# Patient Record
Sex: FEMALE | Race: WHITE
[De-identification: names, ages, dates, MRNs, and addresses within clinical notes are randomized per-mention and may not be internally consistent; named-entity substitution may affect disease eponyms.]

---

## 2018-04-06 ENCOUNTER — HOSPITAL ENCOUNTER (OUTPATIENT)
Dept: HOSPITAL 88 - CT | Age: 53
End: 2018-04-06
Attending: SPECIALIST
Payer: COMMERCIAL

## 2018-04-06 DIAGNOSIS — R19.00: Primary | ICD-10-CM

## 2018-04-06 PROCEDURE — 72192 CT PELVIS W/O DYE: CPT

## 2018-04-06 NOTE — DIAGNOSTIC IMAGING REPORT
PROCEDURE:CT PELVIS WITHOUT CONTRAST

 

COMPARISON:None.

 

INDICATIONS:PELVIC MASS

 

TECHNIQUE:Spiral CT images of the pelvis were performed from the iliac 

crest to the lesser trochanters. No intravenous contrast was 

administered. Redicat was given as oral contrast. Coronal and sagittal 

reformatted images were obtained.

 

FINDINGS:

PELVIC ORGANS:Bladder is unremarkable. Uterus is grossly unremarkable. 

No adnexal masses.

PELVIC NODES:No distal retroperitoneal, pelvic, or inguinal 

adenopathy.

GI TRACT:No bowel dilation or evidence of obstruction. No pericolonic 

inflammatory changes. Moderate retained stool in the colon.

VESSELS:Unremarkable for noncontrast exam.

BONES:No aggressive lytic lesions..

OTHER:Soft tissues are grossly unremarkable..

 

CONCLUSION:

1. Essentially unremarkable exam. No pelvic mass is identified in this 

nonenhanced exam.

 

 

Placido Alex M.D.  

Dictated by:  Placido Alex M.D. on 4/06/2018 at 18:38     

Electronically approved by:  Placido Alex M.D. on 4/06/2018 at 

18:38

## 2018-04-13 ENCOUNTER — HOSPITAL ENCOUNTER (EMERGENCY)
Dept: HOSPITAL 88 - ER | Age: 53
Discharge: HOME | End: 2018-04-13
Payer: COMMERCIAL

## 2018-04-13 VITALS — WEIGHT: 180 LBS | BODY MASS INDEX: 28.93 KG/M2 | HEIGHT: 66 IN

## 2018-04-13 VITALS — SYSTOLIC BLOOD PRESSURE: 106 MMHG | DIASTOLIC BLOOD PRESSURE: 78 MMHG

## 2018-04-13 DIAGNOSIS — K59.00: ICD-10-CM

## 2018-04-13 DIAGNOSIS — K40.91: Primary | ICD-10-CM

## 2018-04-13 PROCEDURE — 74018 RADEX ABDOMEN 1 VIEW: CPT

## 2018-04-13 PROCEDURE — 99283 EMERGENCY DEPT VISIT LOW MDM: CPT

## 2018-04-13 NOTE — XMS REPORT
Patient Summary Document

 Created on: 2018



SIDDHARTH SOLO

External Reference #: [id number]

: 1965

Sex: Female



Demographics







 Address  [address] [place] [address]

 

 Home Phone  [phone]

 

 Preferred Language  Unknown

 

 Marital Status  Unknown

 

 Buddhism Affiliation  Unknown

 

 Race  Unknown

 

 Additional Race(s)  

 

 Ethnic Group  Unknown





Author







 Author  [facility]

 

 Address  Unknown

 

 Phone  Unavailable







Care Team Providers







 Care Team Member Name  Role  Phone

 

 SUSAN RODRÍGUEZ  Unavailable  Unavailable







Problems

This patient has no known problems.



Allergies, Adverse Reactions, Alerts

This patient has no known allergies or adverse reactions.



Medications

This patient has no known medications.



Results







 Test Description  Test Time  Test Comments  Text Results  Atomic Results  
Result Comments









 CT PELVIS WO            [id number] [place] [address]      Patient Name: SIDDHARTH SOLO   MR 
#: [id number]    : 1965 Age/Sex: 52/F  Acct #: [id number] Req #: [id number]
[id number]  Adm Physician:     Ordered by: SUSAN RODRÍGUEZ MD  Report #: [id number]   
Location: CT  Room/Bed:     ____________________________________________________
_______________________________________________    Procedure: [id number] CT/CT 
PELVIS WO  Exam Date: 18                            Exam Time: 1805       
REPORT STATUS: Signed    PROCEDURE:   CT PELVIS WITHOUT CONTRAST       
COMPARISON:   None.       INDICATIONS:   PELVIC MASS       TECHNIQUE:   Spiral 
CT images of the pelvis were performed from the iliac    crest to the lesser 
trochanters. No intravenous contrast was    administered. Redicat was given as 
oral contrast. Coronal and sagittal    reformatted images were obtained.       
FINDINGS:      PELVIC ORGANS:   Bladder is unremarkable. Uterus is grossly 
unremarkable.    No adnexal masses.   PELVIC NODES:   No distal retroperitoneal
, pelvic, or inguinal    adenopathy.   GI TRACT:   No bowel dilation or 
evidence of obstruction. No pericolonic    inflammatory changes. Moderate 
retained stool in the colon.   VESSELS:   Unremarkable for noncontrast exam.   
BONES:   No aggressive lytic lesions..   OTHER:   Soft tissues are grossly 
unremarkable..       CONCLUSION:   1. Essentially unremarkable exam. No pelvic 
mass is identified in this    nonenhanced exam.              Marci Alex M.D.     Dictated by:  Marci Alex M.D. on 2018 at 18:38        
Electronically approved by:  Marci Alex M.D. on 2018 at    18:38  
              Dictated By: MARCI ALEX MD  Electronically Signed By: MARCI ALEX MD on 18 183  Transcribed By: TRACY on 18       COPY 
TO:   SUSAN RODRÍGUEZ MD

## 2018-04-13 NOTE — DIAGNOSTIC IMAGING REPORT
PROCEDURE:X-RAY ABDOMEN - KUB

 

COMPARISON:None.

 

INDICATIONS:LEFT LOWER PELVIC PAIN

 

FINDINGS:

 

There are no dilated loops of bowel to suggest obstruction.  There are 

no masses or abnormal calcifications.   There is no evidence of free 

air. No acute osseous abnormalities are present.

 

 

CONCLUSION:

No acute abdominal abnormality. 

 

 

 

 

Nestor Kwok D.O.  

Dictated by:  Nestor Kwok D.O. on 4/13/2018 at 8:40     

Electronically approved by:  Nestor Kwok D.O. on 4/13/2018 at 8:40

## 2018-06-08 LAB
ANION GAP SERPL CALC-SCNC: 8.9 MMOL/L (ref 8–16)
BASOPHILS # BLD AUTO: 0.1 10*3/UL (ref 0–0.1)
BASOPHILS NFR BLD AUTO: 0.6 % (ref 0–1)
BUN SERPL-MCNC: 13 MG/DL (ref 7–26)
BUN/CREAT SERPL: 17 (ref 6–25)
CALCIUM SERPL-MCNC: 10.2 MG/DL (ref 8.4–10.2)
CHLORIDE SERPL-SCNC: 105 MMOL/L (ref 98–107)
CO2 SERPL-SCNC: 25 MMOL/L (ref 22–29)
DEPRECATED NEUTROPHILS # BLD AUTO: 5.5 10*3/UL (ref 2.1–6.9)
EGFRCR SERPLBLD CKD-EPI 2021: > 60 ML/MIN (ref 60–?)
EOSINOPHIL # BLD AUTO: 0.1 10*3/UL (ref 0–0.4)
EOSINOPHIL NFR BLD AUTO: 1.3 % (ref 0–6)
ERYTHROCYTE [DISTWIDTH] IN CORD BLOOD: 12.5 % (ref 11.7–14.4)
GLUCOSE SERPLBLD-MCNC: 87 MG/DL (ref 74–118)
HCT VFR BLD AUTO: 38.5 % (ref 34.2–44.1)
HGB BLD-MCNC: 13.2 G/DL (ref 12–16)
LYMPHOCYTES # BLD: 1.9 10*3/UL (ref 1–3.2)
LYMPHOCYTES NFR BLD AUTO: 23.3 % (ref 18–39.1)
MCH RBC QN AUTO: 31.5 PG (ref 28–32)
MCHC RBC AUTO-ENTMCNC: 34.3 G/DL (ref 31–35)
MCV RBC AUTO: 91.9 FL (ref 81–99)
MONOCYTES # BLD AUTO: 0.5 10*3/UL (ref 0.2–0.8)
MONOCYTES NFR BLD AUTO: 6.6 % (ref 4.4–11.3)
NEUTS SEG NFR BLD AUTO: 67.8 % (ref 38.7–80)
PLATELET # BLD AUTO: 304 X10E3/UL (ref 140–360)
POTASSIUM SERPL-SCNC: 3.9 MMOL/L (ref 3.5–5.1)
RBC # BLD AUTO: 4.19 X10E6/UL (ref 3.6–5.1)
SODIUM SERPL-SCNC: 135 MMOL/L (ref 136–145)

## 2018-06-11 ENCOUNTER — HOSPITAL ENCOUNTER (OUTPATIENT)
Dept: HOSPITAL 88 - OR | Age: 53
Discharge: HOME | End: 2018-06-11
Attending: SURGERY
Payer: COMMERCIAL

## 2018-06-11 DIAGNOSIS — Z88.0: ICD-10-CM

## 2018-06-11 DIAGNOSIS — Z88.8: ICD-10-CM

## 2018-06-11 DIAGNOSIS — Z01.810: ICD-10-CM

## 2018-06-11 DIAGNOSIS — K40.90: Primary | ICD-10-CM

## 2018-06-11 DIAGNOSIS — Z01.812: ICD-10-CM

## 2018-06-11 DIAGNOSIS — Z91.041: ICD-10-CM

## 2018-06-11 DIAGNOSIS — K44.9: ICD-10-CM

## 2018-06-11 PROCEDURE — 81025 URINE PREGNANCY TEST: CPT

## 2018-06-11 PROCEDURE — 85025 COMPLETE CBC W/AUTO DIFF WBC: CPT

## 2018-06-11 PROCEDURE — 93005 ELECTROCARDIOGRAM TRACING: CPT

## 2018-06-11 PROCEDURE — 80048 BASIC METABOLIC PNL TOTAL CA: CPT

## 2018-06-11 PROCEDURE — 49505 PRP I/HERN INIT REDUC >5 YR: CPT

## 2018-06-11 PROCEDURE — 36415 COLL VENOUS BLD VENIPUNCTURE: CPT

## 2018-06-11 NOTE — XMS REPORT
Continuity of Care Document

 Created on: 2018



ANNIKA SOLO

External Reference #: [id number]

: 1965

Sex: Female



Demographics







 Address  [address] [place]  [address]

 

 Home Phone  [phone]

 

 Preferred Language  Unknown

 

 Marital Status  Unknown

 

 Presybeterian Affiliation  Unknown

 

 Race  White

 

 Ethnic Group  Unknown





Author







 Author  [facility]

 

 Organization  [facility]

 

 Address  [address]

[place]  [address]



 

 Phone  Unavailable







Support







 Name  Relationship  Address  Phone

 

 OSBALDO DIGGS   Caregiver  [address]

[place]  [address]  Unavailable

 

 SUSAN RODRÍGUEZ MD  Caregiver  [address]  [phone]

 

 SUSAN RODRÍGUEZ MD  Caregiver  [address]  [phone]

 

 STACIA SOLO  Next Of Kin  [address] [place], [place]  [phone]







Care Team Providers







 Care Team Member Name  Role  Phone

 

 SUSAN RODRÍGUEZ MD  PCP  [phone]







Insurance Providers







 Guarantor  Annika Solo

 

 Address  [address] [place] [address]

 

 Phone  [phone]

 

 Email  [email or web address]











 Payer  [facility]

 

 Policy Number  [id number]

 

 Subscriber's Name  DenisAnnika

 

 Relationship  18 Self / Same As Patient

 

 Group Number  [id number]

 

 Group Name  [email or web address]

 

 Effective Date  18







Advance Directives







 Directive  Response  Recorded Date/Time

 

 Does the patient have an advance directive?  No  18 4:40pm

 

 If yes, is advance directive on file with [facility]?  No  18 4:40pm

 

 If not on file with [facility] will patient provide a copy?  No  18 4:40pm

 

 Do you have a Directive to Physician?  No  18 8:24am

 

 Do you have a Medical Power of ?  No  18 8:24am

 

 Do you have an out of hospital Do Not Resuscitate Order?  No  18 8:24am

 

 Do you have any special needs we should be aware of?  No  18 8:24am

 

 Do you have a support person here with you today?  Yes  18 8:24am

 

 Did patient receive Notice of Privacy Practices?  Yes  18 8:24am

 

 Did patient receive patient rights and responsibilities?  Yes  18 8:24am







Problems

No problem information available.



Medications

No medication information available.



Social History







 Smoking Status  Start Date  Stop Date

 

 Never Smoker      







Hospital Discharge Instructions

No hospital discharge instruction information available.



Plan of Care







 Discharge Date  18 9:30am

 

 Disposition  HOME, SELF-CARE

 

 Condition at Discharge  Stable

 

 Instructions/Education Provided  Constipation - Adult

 

 Forms Provided  Work/School Excuse

 

 Prescriptions  See Medication Section

 

 Additional Instructions/Education  FOLLOW UP WITH YOUR DOCTOR IN 2 DAYS



DRINK PLEANTY OF WATER



TAKE ALL MEDICATIONS AS PRESCRIBED  







Functional Status

No functional status information available.



Allergies, Adverse Reactions, Alerts







 Allergen  Type  Severity  Reaction  Status  Last Updated

 

 Iodine and Iodide Containing Produc  Allergy  Mild  BURNING  Active  18

 

 Penicillin  Allergy  Mild  "PASSING OUT"  Active  18

 

 Diphenhydramine  Adverse Reaction  Mild  "FEELS HEART RACING"  Active  18







Immunizations

No immunization information available.



Vital Signs





Acute Vital Signs





 Vital  Response  Date/Time

 

 Pulse      

 

    Pulse Rate (adult)  78 bpm (60 - 90)  2018 9:07am

 

 Respiratory Rate  20 bpm (12 - 24)  2018 9:07am

 

 Blood Pressure  106/78 mm Hg  2018 9:07am

 

 Height  5 ft 6 in  2018 7:23am

 

 Weight  180 lb  2018 7:23am

 

 Body Mass Index  29.1 kg/m^2  2018 7:23am







Results

No relevant diagnostic test, laboratory data and/or discharge summary 
information available.



Procedures







 Procedure  Status  Date  Provider(s)

 

 Computed tomography of pelvis without contrast  Active  18  SUSAN RODRÍGUEZ MD







Encounters







 Encounter  Location  Arrival/Admit Date  Discharge/Depart Date  Attending 
Provider

 

 Departed Emergency Room  [facility]  18 7:08am   9:30am  OSBALDO DIGGS DO

 

 Registered Clinic  [facility]  18 4:41pm     SUSAN RODRÍGUEZ MD

## 2018-06-11 NOTE — OPERATIVE REPORT
DATE OF PROCEDURE:  June 11, 2018 



PREOPERATIVE DIAGNOSIS:  Left inguinal hernia.



POSTOPERATIVE DIAGNOSIS:  Left inguinal hernia.  



OPERATION PERFORMED:  Repair of left inguinal hernia with large Prolene 

hernia system.



ASSISTANT:  JAZ Rodriguez.



ANESTHESIA:  General.  



COMPLICATIONS:  None.



ESTIMATED BLOOD LOSS:  Minimal.



DESCRIPTION OF PROCEDURE:  With the patient lying in bed in the supine 

position under good general anesthesia, the abdomen was prepped with 

Betadine solution and draped in the usual manner.  A left inguinal incision 

was made.  It was carried down through the subcutaneous tissue down to the 

external oblique aponeurosis.  The external oblique was opened along the 

length of its fibers and the external inguinal ring was opened.  The cord 

was then mobilized off of the pubic tubercle and ligated with 2-0 Vicryl 

and divided.  The round ligament and the hernia sac were then  and 

individually suture ligated with 2-0 silk and resected.  The hernia sac was 

then reduced back to the intra-abdominal cavity, and the preperitoneal 

space was entered right through the internal ring without any difficulty 

and a pocket was created.  A large Prolene hernia system was then placed in 

the preperitoneal space and the underlay patch was deployed without any 

problems.  The overlay patch was then placed over the floor and sutured to 

the conjoined tendon and inguinal ligament using interrupted sutures of 2-0 

Vicryl.  The whole area was thoroughly irrigated.  Perfect hemostasis was 

ascertained.  All layers were infiltrated on the way out with a solution of 

0.25% Marcaine and 1% lidocaine mixed in equal parts.  The external oblique 

aponeurosis was closed with a running suture of 2-0 Vicryl.  The 

subcutaneous tissue was approximated with 3-0 plain and the skin was closed 

with clips.  A dressing was applied.  The sponge, lap and needle count was 

correct.  The patient tolerated the procedure well and returned to the 

recovery room in stable condition.  

 









DD:  06/11/2018 09:45

DT:  06/11/2018 13:33

Job#:  [id number]

## 2019-08-19 ENCOUNTER — HOSPITAL ENCOUNTER (EMERGENCY)
Dept: HOSPITAL 88 - ER | Age: 54
LOS: 1 days | Discharge: HOME | End: 2019-08-20
Payer: COMMERCIAL

## 2019-08-19 VITALS — BODY MASS INDEX: 28.93 KG/M2 | HEIGHT: 66 IN | WEIGHT: 180 LBS

## 2019-08-19 DIAGNOSIS — K52.1: ICD-10-CM

## 2019-08-19 DIAGNOSIS — R11.2: ICD-10-CM

## 2019-08-19 DIAGNOSIS — R19.7: Primary | ICD-10-CM

## 2019-08-19 PROCEDURE — 74177 CT ABD & PELVIS W/CONTRAST: CPT

## 2019-08-19 PROCEDURE — 83690 ASSAY OF LIPASE: CPT

## 2019-08-19 PROCEDURE — 82550 ASSAY OF CK (CPK): CPT

## 2019-08-19 PROCEDURE — 99284 EMERGENCY DEPT VISIT MOD MDM: CPT

## 2019-08-19 PROCEDURE — 85025 COMPLETE CBC W/AUTO DIFF WBC: CPT

## 2019-08-19 PROCEDURE — 82553 CREATINE MB FRACTION: CPT

## 2019-08-19 PROCEDURE — 83735 ASSAY OF MAGNESIUM: CPT

## 2019-08-19 PROCEDURE — 36415 COLL VENOUS BLD VENIPUNCTURE: CPT

## 2019-08-19 PROCEDURE — 81001 URINALYSIS AUTO W/SCOPE: CPT

## 2019-08-19 PROCEDURE — 80053 COMPREHEN METABOLIC PANEL: CPT

## 2019-08-19 PROCEDURE — 84484 ASSAY OF TROPONIN QUANT: CPT

## 2019-08-19 NOTE — XMS REPORT
Encounter Summary

                             Created on: 2019



TucsonNubia melissan HEENA

External Reference #: [id number]

: 1965

Sex: Female



Demographics







                          Address                   [address] [place]  [address]

 

                          Home Phone                [phone]

 

                          Preferred Language        English

 

                          Marital Status            

 

                          Spiritism Affiliation     Unknown

 

                          Race                      White

 

                          Ethnic Group              Non-





Author







                          Organization              Unknown

 

                          Address                   [address] [place]  [address]



 

                          Phone                     [phone]







Care Team Providers







                    Care Team Member Name    Role                Phone

 

                    Dr. Soila Kevin    3                   [phone]

 

                    Soila Kevin MD    3                   [phone]

 

                    Eric Pierre MD    2                   [phone]

 

                    [facility]    2                   [phone]



                                                      



Reason for Visit

                      





                                        Left ear pain/problem; cough                



                                                                              



Instructions

          





                                        1. Upper respiratory infection

 

                                         Zithromax Z-Justin 250 mg tablet

 

                                         triamcinolone acetonide 40 mg/mL suspension for injection

 

                                        2. Otalgia

 

                                         neomycin-polymyxin-hydrocort 3.5 mg-10,000 unit/mL-1 % ear drops,susp

 

                                        3. Immunization refused

 

                                        4. Body mass index 25-29 - overweight

 

                                         learning about healthy weight







Discussion Note: None recorded.                                                 
                                      



Plan of Care

                      





                Reminders                                                                    Provider 

               

 

                Appointments    None recorded.                   

 

                Lab             None recorded.                   

 

                Referral        None recorded.                   

 

                Procedures      None recorded.                   

 

                Surgeries       None recorded.                   

 

                Imaging         None recorded.                   



                                                                              



Medications

                      





                    Name                      Start Date                                      

 

                                        ipratropium bromide 0.03 % nasal spray

 Spray 2 sprays twice a day by intranasal route.    

 

                                        montelukast 10 mg tablet

 Take 1 tablet every day by oral route.    

 

                                        neomycin-polymyxin-hydrocort 3.5 mg-10,000 unit/mL-1 % ear drops,susp

 INSTILL 4 DROPS INTO AFFECTED EAR(S) BY OTIC ROUTE 3 TIMES PER DAY FOR 5 DAYS    



 

                                        Tessalon Perles 100 mg capsule

 Take 1 capsule 3 times a day by oral route.    

 

                                        triamcinolone acetonide 0.1 % topical cream

 APPLY A THIN LAYER TO THE AFFECTED AREA(S) BY TOPICAL ROUTE 2 TIMES PER DAY    

 

                                        triamcinolone acetonide 40 mg/mL suspension for injection

 1 ml                                   

 

                                        Zithromax Z-Justin 250 mg tablet

 TAKE 2 TABLETS (500 MG) BY ORAL ROUTE ONCE DAILY FOR 1 DAY THEN 1 TABLET (250 
MG) BY ORAL ROUTE ONCE DAILY FOR 4 DAYS    



                                                                                
                                                                   



Medications Administered

                      





                    Name                      Date                                      

 

                                        triamcinolone acetonide 40 mg/mL suspension for injection

                          1 ml                      [id number]:14:13



                                                                                
       



Vitals

          





                Height          Weight          BMI             Blood Pressure 

 

                 5 ft 6 in        185.4 lbs        29.9 kg/m2        120/68 mm[Hg]  



                                                                              



Lab Results

                      



              None recorded.                                                    
                                                



Allergies

          





          Code      Code System    Name      Reaction    Severity    Status    Onset

 

          2034    RxNorm    Benadryl    Irregular Heart Rate    Mild to Moderate    Active    

 

          040028    RxNorm    Carafate    Vomiting             Active    

 

          5493      RxNorm    Hydrocodone    Dizziness    Severe    Active    

 

          5933      RxNorm    Iodine                      Active    

 

                              Penicillins    Dizziness    Moderate to Severe    Active    



                                                                                
                                      



Problems

                      





                Name                      Status                      Onset Date                      

Source                                                  

 

                Left Inguinal Hernia    Active          2018      

 

                Polyp of Colon    Active          02/10/2019      



                                                                                
                  



Procedures

                      





                Date                      Name                      Performed by                      

                

 

                    2018          Hernia Repair       Information not available

 

                    1994          Tubal Ligation      Information not available



                                                                                
                  



Vaccine List

          





                                        Vaccine Type

 

                                        influenza, unspecified formulation

 

                                        2018



                                                                              



Social History

          





                    Smoking Status      Never Smoker         



                                                                              



Past Encounters

                      





                                        2019

Upper Respiratory Infection; Otalgia; Immunization Refused; Body Mass Index 25-
29 - Overweight

Palomo Carroll MD: [address] [place] [address], Ph. 
[phone]

 

                                        2019

Pruritic Rash; Immunization; Body Mass Index 25-29 - Overweight

Soila Kevin MD: [address] [place] [address], Ph. [phone]



                                                                                
                  



History of Present Illness

          



Note:Left ear pain since yesterday. Nasal congestion, sinus pressure and dry 
cough since 4 days ago. Pt was started on montelukast, tessalon capsules and 
ipratropium NS 3 days ago, but she is feeling worse. Denies fever, sob, wheezing
 or chest pain.



Review of Systems:ROS as noted in the HPI                                       
                             



Review of Systems

          None recorded.                                                        
            



Physical Exam

                      





                                                   General Adult Exam (male)

 

                          Reported By:              Patient

 

                          Constitutional:           General Appearance: healthy-appearing, overweight. Level of Distress:

 NAD. Ambulation: ambulating normally

 

                          Psychiatric:              Insight: good judgement. Mental Status: active and alert, normal mood,

 normal affect. Orientation: to time, to place, to person. Memory: recent memory
 normal, remote memory normal

 

                          Eyes:                     Lids and Conjunctivae: non-injected, no discharge. EOM: EOMI

 

                          ENMT:                     Ears: EACs clear, TMs clear. Nose: nares non-patent, sinus tenderness, nasal

 discharge. Lips, Teeth, and Gums: no mouth or lip ulcers. Oropharynx: moist 
mucous membranes, no exudates, tonsils not enlarged, erythema

 

                          Neck:                     Neck: supple, trachea midline. Lymph Nodes: cervical LAD. Thyroid: no enlargement,

 non-tender

 

                          Lungs:                    Auscultation: breath sounds normal

 

                          Cardiovascular:           Heart Auscultation: RRR, normal S1, normal S2, no murmurs

 

                          Neurologic:               Gait and Station: normal gait. Cranial Nerves: grossly intact

## 2019-08-19 NOTE — XMS REPORT
Encounter Summary

                             Created on: 2019



Annika Barrios

External Reference #: [id number]

: 1965

Sex: Female



Demographics







                          Address                   [address] [place]  [address]

 

                          Home Phone                [phone]

 

                          Preferred Language        English

 

                          Marital Status            

 

                          Synagogue Affiliation     Unknown

 

                          Race                      White

 

                          Ethnic Group              Non-





Author







                          Organization              Unknown

 

                          Address                   [address] [place] [address]  [address]



 

                          Phone                     [phone]







Care Team Providers







                    Care Team Member Name    Role                Phone

 

                    Dr. Soila Kevin    3                   [phone]

 

                    Soila Kevin MD    3                   [phone]

 

                    Eric Pierre MD    2                   [phone]

 

                    [facility]    2                   [phone]



                                                      



Reason for Visit

          





                                        skin problem/rash



                                                                    



Instructions

          





                                        1. Pruritic rash

 

                                         dermatology referral - **PLEASE FAX NOTES [phone].**

 

                                         Medrol (Justin) 4 mg tablets in a dose pack

 

                                         triamcinolone acetonide 0.1 % topical cream

 

                                        2. Immunization

 

                                        3. Body mass index 25-29 - overweight

 

                                         learning about healthy weight







Discussion Note: None recorded.                                                 
                                      



Plan of Care

                      





                Reminders                                                                    Provider 

               

 

                Appointments    None recorded.                   

 

                Lab             None recorded.                   

 

                Referral        Dermatology Referral    2019      [facility]

 

                Procedures      None recorded.                   

 

                Surgeries       None recorded.                   

 

                Imaging         None recorded.                   



                                                                                
       



Medications

                      





                    Name                      Start Date                                      

 

                                        Medrol (Justin) 4 mg tablets in a dose pack

 Take 1 dose pk by oral route.          

 

                                        triamcinolone acetonide 0.1 % topical cream

 APPLY A THIN LAYER TO THE AFFECTED AREA(S) BY TOPICAL ROUTE 2 TIMES PER DAY    



                                                                                
                 



Medications Administered

          



  None recorded.                                                                
               



Vitals

          





                Height          Weight          BMI             Blood Pressure 

 

                 5 ft 6 in        185 lbs         29.9 kg/m2        133/60 mm[Hg]  



                                                                              



Lab Results

                      



              None recorded.                                                    
                                                



Allergies

          





          Code      Code System    Name      Reaction    Severity    Status    Onset

 

          2034    RxNorm    Benadryl    Irregular Heart Rate    Mild to Moderate    Active    

 

          1306      RxNorm    Hydrocodone    Dizziness    Severe    Active    

 

          0933      RxNorm    Iodine                      Active    

 

                              Penicillins    Dizziness    Moderate to Severe    Active    



                                                                                
                            



Problems

                      





                Name                      Status                      Onset Date                      

Source                                                  

 

                Left Inguinal Hernia    Active          2018      

 

                Polyp of Colon    Active          02/10/2019      



                                                                                
                  



Procedures

                      





                Date                      Name                      Performed by                      

                

 

                    2018          Hernia Repair       Information not available

 

                    1994          Tubal Ligation      Information not available



                                                                                
                  



Vaccine List

          



None recorded.                                                                  
            



Social History

          





                    Smoking Status      Never Smoker         



                                                                              



Past Encounters

                      





                                        2019

Pruritic Rash; Immunization; Body Mass Index 25-29 - Overweight

Soila Kevin MD: [address] [place] [address], Ph. [phone]



                                                                                
        



History of Present Illness

          



Note:54yo female presents for evaluation of skin rash. Switched to new version 
of Caress body wash in 2018 after working in yard to remove oleander bush 
and developed itchy rash on right arm. Used hydrocortisone cream &amp; rash 
became less red &amp; irritated. Intermittently reoccurred on right arm since 
Nov, despite not using body wash at all. About three weeks ago, developed itchy 
rash on left shoulder. No known exposure. Then developed itchy rash on anterior 
chest.<div>Now has rash on right arm, left shoulder and left anterior chest. No 
blisters, no pain, no drainage. Very itchy despite using OTC 1% hydrocortisone 
cream.</div><div>No new foods. No new soaps or detergents. No new medications - 
only taking stool softners after left inguinal hernia repair on 18 by Dr. Shmuel Pierre.</div><div>Cannot take benadryl (palpitations). Can take 
Claritin.</div>



Review of Systems:ROS as noted in the HPI                                       
                             



Review of Systems

                      





                                                   Comprehensive General Adult ROS

 

                          Reported By:              Patient

 

                          Constitutional:           Constitutional: no fever

 

                          Eyes:                     Eyes: no vision change

 

                          Cardiovascular:           Cardiovascular: no chest pain

 

                          Respiratory:              Respiratory: no cough, no wheezing, no shortness of breath

 

                          Gastrointestinal:         Gastrointestinal: no abdominal pain

 

                          Integumentary:            Skin: rash, itching, dry skin

 

                          Neurologic:               Neurologic: no loss of consciousness, no headaches

 

                          Psychiatric:              Psych: no depression, no alcohol abuse, no anxiety, no suicidal thoughts





                                                                              



Physical Exam

                      





                                                   General Adult Exam (Female)

 

                          Reported By:              Patient

 

                          Constitutional:           General Appearance: healthy-appearing, well-nourished, well-developed.

 Level of Distress: NAD. Ambulation: ambulating normally

 

                          Psychiatric:              Mental Status: active and alert, normal mood, normal affect

 

                          Eyes:                     Lids and Conjunctivae: non-injected. Pupils: PERRLA. EOM: EOMI. Sclerae: 

non-icteric

 

                          ENMT:                     Hearing: no hearing loss. Oropharynx: moist mucous membranes

 

                          Neck:                     Thyroid: non-tender, no nodules

 

                          Lungs:                    Respiratory effort: no dyspnea. Auscultation: breath sounds normal, good

 air movement, no wheezing, no rales/crackles

 

                          Cardiovascular:           Heart Auscultation: RRR, normal S1, normal S2, no murmurs. Neck

 vessels: no carotid bruits

 

                          Abdomen:                  Bowel Sounds: normal. Inspection and Palpation: soft

 

                          Musculoskeletal::         Joints, Bones, and Muscles: normal movement of all extremities.

 Extremities: no edema

 

                          Neurologic:               Gait and Station: normal gait

 

                          Skin:                     Inspection and palpation: rash, lesion; see photos below

## 2019-08-20 LAB
ALBUMIN SERPL-MCNC: 4.1 G/DL (ref 3.5–5)
ALBUMIN/GLOB SERPL: 1.6 {RATIO} (ref 0.8–2)
ALP SERPL-CCNC: 49 IU/L (ref 40–150)
ALT SERPL-CCNC: 20 IU/L (ref 0–55)
ANION GAP SERPL CALC-SCNC: 15.1 MMOL/L (ref 8–16)
BACTERIA URNS QL MICRO: (no result) /HPF
BASOPHILS # BLD AUTO: 0 10*3/UL (ref 0–0.1)
BASOPHILS NFR BLD AUTO: 0.1 % (ref 0–1)
BILIRUB UR QL: NEGATIVE
BUN SERPL-MCNC: 14 MG/DL (ref 7–26)
BUN/CREAT SERPL: 18 (ref 6–25)
CALCIUM SERPL-MCNC: 9.2 MG/DL (ref 8.4–10.2)
CHLORIDE SERPL-SCNC: 106 MMOL/L (ref 98–107)
CK MB SERPL-MCNC: 0.9 NG/ML (ref 0–5)
CK SERPL-CCNC: 62 IU/L (ref 29–168)
CLARITY UR: CLEAR
CO2 SERPL-SCNC: 21 MMOL/L (ref 22–29)
COLOR UR: YELLOW
DEPRECATED NEUTROPHILS # BLD AUTO: 15 10*3/UL (ref 2.1–6.9)
DEPRECATED RBC URNS MANUAL-ACNC: (no result) /HPF (ref 0–5)
EGFRCR SERPLBLD CKD-EPI 2021: > 60 ML/MIN (ref 60–?)
EOSINOPHIL # BLD AUTO: 0 10*3/UL (ref 0–0.4)
EOSINOPHIL NFR BLD AUTO: 0.1 % (ref 0–6)
EPI CELLS URNS QL MICRO: (no result) /LPF
ERYTHROCYTE [DISTWIDTH] IN CORD BLOOD: 12.8 % (ref 11.7–14.4)
GLOBULIN PLAS-MCNC: 2.6 G/DL (ref 2.3–3.5)
GLUCOSE SERPLBLD-MCNC: 123 MG/DL (ref 74–118)
HCT VFR BLD AUTO: 38.3 % (ref 34.2–44.1)
HGB BLD-MCNC: 12.9 G/DL (ref 12–16)
KETONES UR QL STRIP.AUTO: NEGATIVE
LEUKOCYTE ESTERASE UR QL STRIP.AUTO: NEGATIVE
LIPASE SERPL-CCNC: 20 U/L (ref 8–78)
LYMPHOCYTES # BLD: 0.6 10*3/UL (ref 1–3.2)
LYMPHOCYTES NFR BLD AUTO: 3.3 % (ref 18–39.1)
MAGNESIUM SERPL-MCNC: 1.9 MG/DL (ref 1.3–2.1)
MCH RBC QN AUTO: 31.4 PG (ref 28–32)
MCHC RBC AUTO-ENTMCNC: 33.7 G/DL (ref 31–35)
MCV RBC AUTO: 93.2 FL (ref 81–99)
MONOCYTES # BLD AUTO: 0.9 10*3/UL (ref 0.2–0.8)
MONOCYTES NFR BLD AUTO: 5.5 % (ref 4.4–11.3)
MUCOUS THREADS URNS QL MICRO: (no result)
NEUTS SEG NFR BLD AUTO: 90.5 % (ref 38.7–80)
NITRITE UR QL STRIP.AUTO: NEGATIVE
PLATELET # BLD AUTO: 279 X10E3/UL (ref 140–360)
POTASSIUM SERPL-SCNC: 4.1 MMOL/L (ref 3.5–5.1)
PROT UR QL STRIP.AUTO: NEGATIVE
RBC # BLD AUTO: 4.11 X10E6/UL (ref 3.6–5.1)
SODIUM SERPL-SCNC: 138 MMOL/L (ref 136–145)
SP GR UR STRIP: 1.01 (ref 1.01–1.02)
UROBILINOGEN UR STRIP-MCNC: 0.2 MG/DL (ref 0.2–1)
WBC #/AREA URNS HPF: (no result) /HPF (ref 0–5)

## 2019-08-20 NOTE — DIAGNOSTIC IMAGING REPORT
EXAM: CT Abdomen and Pelvis WITH contrast  

INDICATION: Abdominal cramping, nausea, vomiting, diarrhea  

COMPARISON: None.

TECHNIQUE: Abdomen and pelvis were scanned utilizing a multidetector helical

scanner from the lung base to the pubic symphysis after administration of IV

contrast. Coronal and sagittal reformations were obtained. Routine protocol was

performed. Scan was performed when during portal venous phase.    

     IV CONTRAST: 100 mL of Isovue 370

     ORAL CONTRAST: None

            

COMPLICATIONS: None



RADIATION DOSE:

     Total DLP: 529 mGy*cm

     Estimated effective dose: (DLP x 0.015 x size factor) mSv

     CTDIvol has been reviewed. It is below the limits set by the Radiation

Protocol Committee (RPC).

     Dose modulation, iterative reconstruction, and/or weight based adjustment

of the mA/kV was utilized to reduce the radiation dose to as low as reasonably

achievable. 



FINDINGS:



LINES and TUBES: None.



LOWER THORAX:  Unremarkable



HEPATOBILIARY:      No focal hepatic lesions. No biliary ductal dilation. 



GALLBLADDER: No radio-opaque stones or sludge.  No wall thickening.



SPLEEN: No splenomegaly. 



PANCREAS: No focal masses or ductal dilatation.  



ADRENALS: No adrenal nodules    



KIDNEYS/URETERS: Kidneys enhance symmetrically.  No hydronephrosis. A 1.8 cm

simple cysts in the right renal superior pole. No solid mass lesions.  No

stones.



GI TRACT: No abnormal distention, wall thickening, or evidence of bowel

obstruction.  Appendix is not clearly identified. There is however no fat

stranding or adenopathy in the right lower quadrant to suggest appendicitis.



PELVIC ORGANS/BLADDER: Uterine fibroids, largest fibroid measures 4.9 cm in the

right posterior uterine fundus.



LYMPH NODES: No lymphadenopathy.



VESSELS: Unremarkable.



PERITONEUM / RETROPERITONEUM: No free air or fluid.



BONES: Unremarkable.



SOFT TISSUES: Unremarkable.            



IMPRESSION: 



Uterine fibroids.



Signed by: Jone Oh DO on 8/20/2019 4:39 AM

## 2019-08-20 NOTE — NUR
PT STATES SHE FEELS BETTER AFTER MEDICATIONS AND FLUIDS.  BOLUS INFUSED, SALINE 
LOCKED IV.  VSS.  PT UP TO RESTROOM TO COLLECT  URINE SAMPLE

## 2023-07-27 DIAGNOSIS — K52.9 NONINFECTIOUS GASTROENTERITIS AND COLITIS: Primary | ICD-10-CM

## 2023-07-27 DIAGNOSIS — Z12.11 SCREENING FOR COLON CANCER: ICD-10-CM

## 2023-11-01 ENCOUNTER — OFFICE VISIT (OUTPATIENT)
Dept: GASTROENTEROLOGY | Facility: CLINIC | Age: 58
End: 2023-11-01
Payer: COMMERCIAL

## 2023-11-01 ENCOUNTER — TELEPHONE (OUTPATIENT)
Dept: GASTROENTEROLOGY | Facility: CLINIC | Age: 58
End: 2023-11-01

## 2023-11-01 VITALS
OXYGEN SATURATION: 96 % | HEIGHT: 66 IN | SYSTOLIC BLOOD PRESSURE: 136 MMHG | DIASTOLIC BLOOD PRESSURE: 75 MMHG | WEIGHT: 196 LBS | HEART RATE: 83 BPM | BODY MASS INDEX: 31.5 KG/M2

## 2023-11-01 DIAGNOSIS — Z12.11 SCREENING FOR COLON CANCER: ICD-10-CM

## 2023-11-01 DIAGNOSIS — R19.7 DIARRHEA, UNSPECIFIED TYPE: ICD-10-CM

## 2023-11-01 DIAGNOSIS — R19.4 CHANGE IN BOWEL HABITS: ICD-10-CM

## 2023-11-01 DIAGNOSIS — Z98.890 HISTORY OF INGUINAL HERNIA REPAIR: ICD-10-CM

## 2023-11-01 DIAGNOSIS — K80.20 GALLSTONES: ICD-10-CM

## 2023-11-01 DIAGNOSIS — R14.0 BLOATING SYMPTOM: Primary | ICD-10-CM

## 2023-11-01 DIAGNOSIS — R10.11 RIGHT UPPER QUADRANT PAIN: ICD-10-CM

## 2023-11-01 DIAGNOSIS — K52.9 NONINFECTIOUS GASTROENTERITIS, UNSPECIFIED TYPE: ICD-10-CM

## 2023-11-01 DIAGNOSIS — Z87.19 HISTORY OF INGUINAL HERNIA REPAIR: ICD-10-CM

## 2023-11-01 DIAGNOSIS — Z86.010 HISTORY OF COLON POLYPS: ICD-10-CM

## 2023-11-01 PROCEDURE — 1160F RVW MEDS BY RX/DR IN RCRD: CPT | Mod: CPTII,S$GLB,,

## 2023-11-01 PROCEDURE — 3008F BODY MASS INDEX DOCD: CPT | Mod: CPTII,S$GLB,,

## 2023-11-01 PROCEDURE — 3078F DIAST BP <80 MM HG: CPT | Mod: CPTII,S$GLB,,

## 2023-11-01 PROCEDURE — 3075F PR MOST RECENT SYSTOLIC BLOOD PRESS GE 130-139MM HG: ICD-10-PCS | Mod: CPTII,S$GLB,,

## 2023-11-01 PROCEDURE — 1159F MED LIST DOCD IN RCRD: CPT | Mod: CPTII,S$GLB,,

## 2023-11-01 PROCEDURE — 99205 PR OFFICE/OUTPT VISIT, NEW, LEVL V, 60-74 MIN: ICD-10-PCS | Mod: S$GLB,,,

## 2023-11-01 PROCEDURE — 1159F PR MEDICATION LIST DOCUMENTED IN MEDICAL RECORD: ICD-10-PCS | Mod: CPTII,S$GLB,,

## 2023-11-01 PROCEDURE — 1160F PR REVIEW ALL MEDS BY PRESCRIBER/CLIN PHARMACIST DOCUMENTED: ICD-10-PCS | Mod: CPTII,S$GLB,,

## 2023-11-01 PROCEDURE — 3008F PR BODY MASS INDEX (BMI) DOCUMENTED: ICD-10-PCS | Mod: CPTII,S$GLB,,

## 2023-11-01 PROCEDURE — 99205 OFFICE O/P NEW HI 60 MIN: CPT | Mod: S$GLB,,,

## 2023-11-01 PROCEDURE — 3075F SYST BP GE 130 - 139MM HG: CPT | Mod: CPTII,S$GLB,,

## 2023-11-01 PROCEDURE — 3078F PR MOST RECENT DIASTOLIC BLOOD PRESSURE < 80 MM HG: ICD-10-PCS | Mod: CPTII,S$GLB,,

## 2023-11-01 RX ORDER — SOD SULF/POT CHLORIDE/MAG SULF 1.479 G
TABLET ORAL
Qty: 24 TABLET | Refills: 0 | Status: SHIPPED | OUTPATIENT
Start: 2023-11-01 | End: 2024-01-03

## 2023-11-01 RX ORDER — ONDANSETRON 4 MG/1
TABLET, ORALLY DISINTEGRATING ORAL
COMMUNITY
Start: 2023-10-15 | End: 2024-01-03

## 2023-11-01 RX ORDER — IBUPROFEN 800 MG/1
TABLET ORAL
COMMUNITY
Start: 2023-08-23 | End: 2024-01-03

## 2023-11-01 RX ORDER — CEPHALEXIN 500 MG/1
CAPSULE ORAL
COMMUNITY
Start: 2023-09-26 | End: 2023-11-01

## 2023-11-01 NOTE — PATIENT INSTRUCTIONS
Schedule colonoscopy with Dr. Morfin.   Will refer to Dr. Gordon Hernández for gallbladder and inguinal hernia.     Please notify my office if you have not been contacted within two weeks after any procedures, submitting any samples (biopsies, blood, stool, urine, etc.) or after any imaging (X-ray, CT, MRI, etc.).

## 2023-11-01 NOTE — PROGRESS NOTES
Clinic Note    Reason for visit:  The primary encounter diagnosis was Bloating symptom. Diagnoses of Gallstones, Diarrhea, unspecified type, Right upper quadrant pain, Change in bowel habits, History of colon polyps, Screening for colon cancer, Noninfectious gastroenteritis, unspecified type, and History of inguinal hernia repair were also pertinent to this visit.    PCP: Genna Holman   [address] / [place] [address]    HPI:  This is a 57 y.o. female here to be established. Patient states she has daily BM, easy to pass, complete. In the past 3-4 months, she has had episodes where she will have abdominal bloating and loose stools. This may last a week or so then go back to normal. She had colonoscopy around 2019 in [place], [place]. She believes she had one or two polyps removed that were benign. No blood in stool. Rarely takes ibuprofen.       Went to [place] ER 2 weeks ago with RUQ pain, had CT scan and told had gallstones. The RUQ pain came on at about 1 AM and lasted through the next day. The pain would come in waves. Has not had that pain since that time.       She had left inguinal hernia repair with mesh. She still has pain in left groin area where this surgery was done. The pain is always there. Doesn't take anything for the pain.   She gained 10 pounds recently.       Review of Systems   Constitutional:  Positive for unexpected weight change. Negative for fatigue and fever.   HENT:  Negative for mouth sores, postnasal drip, sore throat and trouble swallowing.    Eyes:  Negative for pain, discharge and eye dryness.   Respiratory:  Negative for apnea, cough, choking, chest tightness, shortness of breath and wheezing.    Cardiovascular:  Negative for chest pain, palpitations and leg swelling.   Gastrointestinal:  Positive for abdominal pain and change in bowel habit. Negative for abdominal distention, anal bleeding, blood in stool, constipation, diarrhea, nausea, rectal pain, vomiting, reflux and  fecal incontinence.   Genitourinary:  Negative for bladder incontinence, dysuria and hematuria.   Musculoskeletal:  Negative for arthralgias, back pain and joint swelling.   Integumentary:  Negative for color change and rash.   Allergic/Immunologic: Negative for environmental allergies and food allergies.   Neurological:  Negative for seizures and headaches.   Hematological:  Negative for adenopathy. Does not bruise/bleed easily.        Past Medical History:   Diagnosis Date    Gallstones     Other seasonal allergic rhinitis     Post-menopausal      Past Surgical History:   Procedure Laterality Date    HERNIA REPAIR Left 2017    inguinal     Family History   Problem Relation Age of Onset    Colon cancer Neg Hx     Rectal cancer Neg Hx     Ulcerative colitis Neg Hx     Crohn's disease Neg Hx     Pancreatic cancer Neg Hx     Stomach cancer Neg Hx      Social History     Tobacco Use    Smoking status: Never    Smokeless tobacco: Never   Substance Use Topics    Alcohol use: Never    Drug use: Not Currently     Review of patient's allergies indicates:   Allergen Reactions    Covid-19 vac, bv (moderna)(pf) Other (See Comments)     BP rises, heat, burning pain    Hydrocodone Shortness Of Breath    Pcn [penicillins] Other (See Comments)     Mood changes, heart palpitations     Benadryl allergy decongestant Palpitations    Phenergan [promethazine] Nausea And Vomiting    Sulfa (sulfonamide antibiotics) Diarrhea and Nausea And Vomiting        Medication List with Changes/Refills   New Medications    SOD SULF-POT CHLORIDE-MAG SULF (SUTAB) 1.479-0.188- 0.225 GRAM TABLET    Take according to package instructions with indicated amount of water. No breakfast day before test. May substitute with Suprep, Clenpiq, Plenvu, Moviprep or GoLytely based on Rx plan and patient preference.   Current Medications    IBUPROFEN (ADVIL,MOTRIN) 800 MG TABLET    as needed.    ONDANSETRON (ZOFRAN-ODT) 4 MG TBDL    DISSOLVE 1 TABLET ON THE TONGUE  "EVERY 6 HOURS   Discontinued Medications    CEPHALEXIN (KEFLEX) 500 MG CAPSULE             Vital Signs:  /75   Pulse 83   Ht 5' 6" (1.676 m)   Wt 88.9 kg (196 lb)   SpO2 96%   BMI 31.64 kg/m²         Physical Exam  Vitals reviewed.   Constitutional:       General: She is awake. She is not in acute distress.     Appearance: Normal appearance. She is well-developed. She is not ill-appearing, toxic-appearing or diaphoretic.   HENT:      Head: Normocephalic and atraumatic.      Nose: Nose normal.      Mouth/Throat:      Mouth: Mucous membranes are moist.      Pharynx: Oropharynx is clear. No oropharyngeal exudate or posterior oropharyngeal erythema.   Eyes:      General: Lids are normal. Gaze aligned appropriately. No scleral icterus.        Right eye: No discharge.         Left eye: No discharge.      Conjunctiva/sclera: Conjunctivae normal.   Neck:      Trachea: Trachea normal.   Cardiovascular:      Rate and Rhythm: Normal rate and regular rhythm.      Pulses:           Radial pulses are 2+ on the right side and 2+ on the left side.   Pulmonary:      Effort: Pulmonary effort is normal. No respiratory distress.      Breath sounds: No stridor. No wheezing.   Chest:      Chest wall: No tenderness.   Abdominal:      General: Bowel sounds are normal. There is no distension.      Palpations: Abdomen is soft. There is no fluid wave, hepatomegaly or mass.      Tenderness: There is no abdominal tenderness. There is no guarding or rebound.   Musculoskeletal:         General: No tenderness or deformity.      Cervical back: Full passive range of motion without pain and neck supple. No tenderness.      Right lower leg: No edema.      Left lower leg: No edema.   Lymphadenopathy:      Cervical: No cervical adenopathy.   Skin:     General: Skin is warm and dry.      Capillary Refill: Capillary refill takes less than 2 seconds.      Coloration: Skin is not cyanotic, jaundiced or pale.   Neurological:      General: No focal " deficit present.      Mental Status: She is alert and oriented to person, place, and time.      Motor: No tremor.   Psychiatric:         Attention and Perception: Attention normal.         Mood and Affect: Mood and affect normal.         Speech: Speech normal.         Behavior: Behavior normal. Behavior is cooperative.            All of the data above and below has been reviewed by myself and any further interpretations will be reflected in the assessment and plan.   The data includes review of external notes, and independent interpretation of lab results, procedures, x-rays, and imaging reports.      Assessment:  Bloating symptom    Gallstones  -     Ambulatory referral/consult to General Surgery; Future; Expected date: 11/08/2023    Diarrhea, unspecified type  -     Ambulatory Referral to External Surgery    Right upper quadrant pain  -     Ambulatory referral/consult to General Surgery; Future; Expected date: 11/08/2023    Change in bowel habits    History of colon polyps  -     Ambulatory Referral to External Surgery    Screening for colon cancer  -     Ambulatory referral/consult to Gastroenterology  -     Ambulatory Referral to External Surgery  -     sod sulf-pot chloride-mag sulf (SUTAB) 1.479-0.188- 0.225 gram tablet; Take according to package instructions with indicated amount of water. No breakfast day before test. May substitute with Suprep, Clenpiq, Plenvu, Moviprep or GoLytely based on Rx plan and patient preference.  Dispense: 24 tablet; Refill: 0    Noninfectious gastroenteritis, unspecified type  -     Ambulatory referral/consult to Gastroenterology    History of inguinal hernia repair  -     Ambulatory referral/consult to General Surgery; Future; Expected date: 11/08/2023    Offered HIDA scan. She would like to be referred to general surgeon for further work up of gallbladder. She can also address inguinal hernia with Dr. Hernández.  Unclear if symptoms related to gallbladder. She would like to schedule  colonoscopy next year. She will be due for screening.      Recommendations:  Schedule colonoscopy with Dr. Morfin.   Will refer to Dr. Gordon Hernández for gallbladder and inguinal hernia.     Risks, benefits, and alternatives of medical management, any associated procedures, and/or treatment discussed with the patient. Patient given opportunity to ask questions and voices understanding. Patient has elected to proceed with the recommended care modalities as discussed.    Instructed patient to notify my office if they have not been contacted within two weeks after any procedures, submitting any samples (biopsies, blood, stool, urine, etc.) or after any imaging (X-ray, CT, MRI, etc.).     Follow up if symptoms worsen or fail to improve.    Order summary:  Orders Placed This Encounter   Procedures    Ambulatory Referral to External Surgery    Ambulatory referral/consult to General Surgery          This document may have been created using a voice recognition transcribing system. Incorrect words or phrases may have been missed during proofreading. Please interpret accordingly or contact me for clarification.     Breanne Ortega NP

## 2023-11-01 NOTE — TELEPHONE ENCOUNTER
Faxed [place] to [facility] Records [phone] requesting entire records from 1/23-Current. [email or web address]

## 2023-11-16 NOTE — PROGRESS NOTES
[facility] Record:   Biliary colic cholelithiasis   Labs 10/15/2023: CBC/CMP/Lipase/Amylase wnl, Tb 0.7  CT AP IV 10/15/2023: subtle sludge or stones in GB neck, no casey dil. Right kidney cyst (2 cm). Possible uterine fibroid (2 cm). Colonic diverticula.   MLC

## 2024-01-03 ENCOUNTER — OFFICE VISIT (OUTPATIENT)
Dept: SURGERY | Facility: CLINIC | Age: 59
End: 2024-01-03
Payer: COMMERCIAL

## 2024-01-03 VITALS — BODY MASS INDEX: 30.53 KG/M2 | WEIGHT: 190 LBS | HEIGHT: 66 IN

## 2024-01-03 DIAGNOSIS — Z87.19 HISTORY OF INGUINAL HERNIA REPAIR: ICD-10-CM

## 2024-01-03 DIAGNOSIS — Z98.890 HISTORY OF INGUINAL HERNIA REPAIR: ICD-10-CM

## 2024-01-03 DIAGNOSIS — R10.11 RIGHT UPPER QUADRANT PAIN: ICD-10-CM

## 2024-01-03 DIAGNOSIS — K80.10 CALCULUS OF GALLBLADDER WITH CHOLECYSTITIS WITHOUT BILIARY OBSTRUCTION, UNSPECIFIED CHOLECYSTITIS ACUITY: Primary | ICD-10-CM

## 2024-01-03 DIAGNOSIS — K80.20 GALLSTONES: ICD-10-CM

## 2024-01-03 PROCEDURE — 3008F BODY MASS INDEX DOCD: CPT | Mod: CPTII,S$GLB,, | Performed by: SURGERY

## 2024-01-03 PROCEDURE — 99203 OFFICE O/P NEW LOW 30 MIN: CPT | Mod: S$GLB,,, | Performed by: SURGERY

## 2024-01-03 PROCEDURE — 1159F MED LIST DOCD IN RCRD: CPT | Mod: CPTII,S$GLB,, | Performed by: SURGERY

## 2024-01-03 PROCEDURE — 1160F RVW MEDS BY RX/DR IN RCRD: CPT | Mod: CPTII,S$GLB,, | Performed by: SURGERY

## 2024-01-03 NOTE — PROGRESS NOTES
History & Physical    SUBJECTIVE:     History of Present Illness:    58-year-old female is referred by Breanne Ortega NP for symptomatic cholelithiasis.  Back in October patient had episode of right upper quadrant pain that lasted several hours.  She went to the emergency room and workup with CT scan showed cholelithiasis.  She denies fever, chills, nausea or vomiting.  She does have some bloating more than usual.  Previous left inguinal hernia repair with chronic left inguinal pain.  Patient has had no episodes of right upper quadrant pain since her initial attack back in October    Chief Complaint   Patient presents with    Cholelithiasis     Occasional bloating and constipation/diarrhea         Review of patient's allergies indicates:  Review of patient's allergies indicates:   Allergen Reactions    Covid-19 vac, bv (moderna)(pf) Other (See Comments)     BP rises, heat, burning pain    Hydrocodone Shortness Of Breath    Pcn [penicillins] Other (See Comments)     Mood changes, heart palpitations     Benadryl allergy decongestant Palpitations    Phenergan [promethazine] Nausea And Vomiting    Sulfa (sulfonamide antibiotics) Diarrhea and Nausea And Vomiting       Current Outpatient Medications on File Prior to Visit   Medication Sig Dispense Refill    [DISCONTINUED] ibuprofen (ADVIL,MOTRIN) 800 MG tablet as needed.      [DISCONTINUED] ondansetron (ZOFRAN-ODT) 4 MG TbDL DISSOLVE 1 TABLET ON THE TONGUE EVERY 6 HOURS      [DISCONTINUED] sod sulf-pot chloride-mag sulf (SUTAB) 1.479-0.188- 0.225 gram tablet Take according to package instructions with indicated amount of water. No breakfast day before test. May substitute with Suprep, Clenpiq, Plenvu, Moviprep or GoLytely based on Rx plan and patient preference. 24 tablet 0     No current facility-administered medications on file prior to visit.       Past Medical History:   Diagnosis Date    Gallstones     Other seasonal allergic rhinitis     Post-menopausal      Past  Surgical History:   Procedure Laterality Date    HERNIA REPAIR Left 2017    inguinal    TUBAL LIGATION       Family History   Problem Relation Age of Onset    Gout Maternal Grandmother     Colon cancer Neg Hx     Rectal cancer Neg Hx     Ulcerative colitis Neg Hx     Crohn's disease Neg Hx     Pancreatic cancer Neg Hx     Stomach cancer Neg Hx        Social History     Socioeconomic History    Marital status:    Tobacco Use    Smoking status: Never    Smokeless tobacco: Never   Substance and Sexual Activity    Alcohol use: Never    Drug use: Not Currently          Review of Systems   Constitutional: Negative.    Respiratory: Negative.     Cardiovascular: Negative.    Gastrointestinal:  Positive for abdominal pain.   Genitourinary: Negative.    Musculoskeletal: Negative.    Neurological: Negative.    Endo/Heme/Allergies: Negative.    Psychiatric/Behavioral: Negative.         OBJECTIVE:     There were no vitals filed for this visit.              Physical Exam:  Physical Exam  Constitutional:       Appearance: Normal appearance. She is normal weight.   HENT:      Head: Normocephalic and atraumatic.   Eyes:      Extraocular Movements: Extraocular movements intact.      Pupils: Pupils are equal, round, and reactive to light.   Cardiovascular:      Rate and Rhythm: Normal rate and regular rhythm.   Pulmonary:      Effort: Pulmonary effort is normal.      Breath sounds: Normal breath sounds.   Abdominal:      General: Abdomen is flat. Bowel sounds are normal. There is no distension.      Palpations: Abdomen is soft.      Tenderness: There is abdominal tenderness (Mild tenderness noted in the left upper abdomen).   Musculoskeletal:         General: No swelling. Normal range of motion.      Cervical back: Normal range of motion.   Skin:     General: Skin is warm and dry.      Coloration: Skin is not jaundiced.   Neurological:      General: No focal deficit present.      Mental Status: She is alert and oriented to  person, place, and time.      Cranial Nerves: No cranial nerve deficit.   Psychiatric:         Mood and Affect: Mood normal.         Behavior: Behavior normal.         Thought Content: Thought content normal.             ASSESSMENT/PLAN:   Cholelithiasis with episode of biliary colic  PLAN:  Discussed with patient laparoscopic cholecystectomy.  We discussed the potential complications related to gallstones including cholecystitis as well as choledocholithiasis and pancreatitis.  The procedure, risk and benefits were discussed with patient and all questions answered.  Patient would like to talk to her insurance company prior to scheduling surgery will notify us when she is ready to schedule surgery.

## 2024-01-18 ENCOUNTER — TELEPHONE (OUTPATIENT)
Dept: SURGERY | Facility: CLINIC | Age: 59
End: 2024-01-18
Payer: COMMERCIAL

## 2024-01-18 NOTE — TELEPHONE ENCOUNTER
Returned call to pt and scheduled her lap charles wei/ Dr. Hernández for 03/07/23 per pt request. Advised pt that someone from Dr. Hernández clinic would contact her next week with instructions and time to come in and sign surgery consents. Pt voiced understanding. - RAE

## 2024-01-18 NOTE — TELEPHONE ENCOUNTER
----- Message from Katelyn Patino sent at 1/18/2024  1:24 PM CST -----  Contact: Patient  Type:  Patient Returning Call    Who Called:Marga Keenan   Who Left Message for Patient:Merlene  Does the patient know what this is regarding?:Scheduling  Would the patient rather a call back or a response via [facility]? Call back  Best Call Back Number:[phone]  Additional Information: Gallbladder Surgery

## 2024-01-23 ENCOUNTER — TELEPHONE (OUTPATIENT)
Dept: GASTROENTEROLOGY | Facility: CLINIC | Age: 59
End: 2024-01-23
Payer: COMMERCIAL

## 2024-01-23 NOTE — TELEPHONE ENCOUNTER
Called patient to move her procedure to another date since [facility] will be out of office. Lvm telling her to call the office back. - dmp

## 2024-02-27 DIAGNOSIS — K80.10 CALCULUS OF GALLBLADDER WITH CHOLECYSTITIS WITHOUT BILIARY OBSTRUCTION, UNSPECIFIED CHOLECYSTITIS ACUITY: Primary | ICD-10-CM

## 2024-02-27 LAB
ALBUMIN SERPL BCP-MCNC: 3.8 G/DL (ref 3.4–5)
ALBUMIN/GLOBULIN RATIO: 1.27 RATIO (ref 1.1–1.8)
ALP SERPL-CCNC: 77 U/L (ref 46–116)
ALT SERPL W P-5'-P-CCNC: 29 U/L (ref 12–78)
ANION GAP SERPL CALC-SCNC: 9 MMOL/L (ref 3–11)
AST SERPL-CCNC: 14 U/L (ref 15–37)
BASOPHILS NFR BLD: 0.6 % (ref 0–3)
BILIRUB CONJ+UNCONJ SERPL-MCNC: 0.5 MG/DL (ref 0–0.7)
BILIRUB SERPL-MCNC: 0.6 MG/DL (ref 0–1)
BILIRUBIN DIRECT+TOT PNL SERPL-MCNC: 0.1 MG/DL (ref 0–0.3)
BUN SERPL-MCNC: 15 MG/DL (ref 7–18)
BUN/CREAT SERPL: 20.27 RATIO (ref 7–18)
CALCIUM SERPL-MCNC: 8.9 MG/DL (ref 8.8–10.5)
CHLORIDE SERPL-SCNC: 104 MMOL/L (ref 100–108)
CO2 SERPL-SCNC: 27 MMOL/L (ref 21–32)
CREAT SERPL-MCNC: 0.74 MG/DL (ref 0.55–1.02)
EOSINOPHIL NFR BLD: 1.4 % (ref 1–3)
ERYTHROCYTE [DISTWIDTH] IN BLOOD BY AUTOMATED COUNT: 13.4 % (ref 12.5–18)
GFR ESTIMATION: > 60
GLOBULIN: 3 G/DL (ref 2.3–3.5)
GLUCOSE SERPL-MCNC: 112 MG/DL (ref 70–110)
HCT VFR BLD AUTO: 37.2 % (ref 37–47)
HGB BLD-MCNC: 12.5 G/DL (ref 12–16)
LYMPHOCYTES NFR BLD: 21.4 % (ref 25–40)
MCH RBC QN AUTO: 30.3 PG (ref 27–31.2)
MCHC RBC AUTO-ENTMCNC: 33.6 G/DL (ref 31.8–35.4)
MCV RBC AUTO: 90.1 FL (ref 80–97)
MONOCYTES NFR BLD: 7.1 % (ref 1–15)
NEUTROPHILS # BLD AUTO: 4.49 10*3/UL (ref 1.8–7.7)
NEUTROPHILS NFR BLD: 69.2 % (ref 37–80)
NUCLEATED RED BLOOD CELLS: 0 %
PLATELETS: 294 10*3/UL (ref 142–424)
POTASSIUM SERPL-SCNC: 3.9 MMOL/L (ref 3.6–5.2)
PROT SERPL-MCNC: 6.8 G/DL (ref 6.4–8.2)
RBC # BLD AUTO: 4.13 10*6/UL (ref 4.2–5.4)
SODIUM BLD-SCNC: 140 MMOL/L (ref 135–145)
WBC # BLD: 6.5 10*3/UL (ref 4.6–10.2)

## 2024-03-04 NOTE — PROGRESS NOTES
[facility] [place] [facility] [place] [facility]  [address]   [address]  [place] [address]  Phone: [phone]  Fax: [phone]    History & Physical         Provider: Dr. Gordon Hernández    Patient Name: Marga SIERRA (age):1965  58 y.o.           Gender: female   Phone: [phone]     Referring Physician:      Subjective: No health changes since previous encounter. No changes in pain since procedure was scheduled at previous visit. Denies any fevers or infections. Denies any issues with clotting or bleeding.           History:      Full Medication List:  No current outpatient medications on file.     Allergies:  Covid-19 vac, bv (moderna)(pf); Hydrocodone; Pcn [penicillins]; Benadryl allergy decongestant; Phenergan [promethazine]; and Sulfa (sulfonamide antibiotics)     Medical History:   has a past medical history of Gallstones, Other seasonal allergic rhinitis, and Post-menopausal.    Surgical History:   has a past surgical history that includes Hernia repair (Left, 2017) and Tubal ligation.    Family History:  family history includes Gout in her maternal grandmother.    Social History:   reports that she has never smoked. She has never used smokeless tobacco. She reports that she does not currently use drugs. She reports that she does not drink alcohol.    Physical Examination:     GENERAL: Well appearing, in no acute distress, alert and oriented. If Abnormal: ___________________________________________    PSYCH:  Mood and affect appropriate.     If Abnormal: ___________________________________________    SKIN: Skin color, texture, turgor normal in procedure area   If Abnormal: ___________________________________________  No rashes or lesions which will impact the procedure.    CV: RRR with palpation of the radial artery.     If Abnormal: ___________________________________________    PULM: No evidence of respiratory difficulty,  symmetric chest rise.   If Abnormal: ___________________________________________  Clear to auscultation.    NEURO: Alert. Oriented. Speech fluent and appropriate.    If Abnormal: ___________________________________________     Moving all extremities.    Plan:    Patient has been evaluated immediately prior to procedure and no significant changes in pain or significant medical changes noted at this time that would interfere with our planned procedure.    Proceed with procedure as planned.       Physician Signature: _____________________________________         Date: ___________   Time: ________

## 2024-03-07 ENCOUNTER — OUTSIDE PLACE OF SERVICE (OUTPATIENT)
Dept: SURGERY | Facility: CLINIC | Age: 59
End: 2024-03-07

## 2024-03-07 PROCEDURE — 47562 LAPAROSCOPIC CHOLECYSTECTOMY: CPT | Mod: ,,, | Performed by: SURGERY

## 2024-03-11 NOTE — TELEPHONE ENCOUNTER
Reschedule  Received: Today   Pt Advice, Appointment Access  Montse Howe Staff; P [facility] Procedure Scheduling  Caller: pt (Today,  8:44 AM)  Pt calling to cancel appt for 4/19/2024 and would like to pushed back to May 2024 and she can be reached at [phone].    Thanks,

## 2024-03-11 NOTE — TELEPHONE ENCOUNTER
Patient's procedure was rescheduled from 4/19/24 to 7/19/24 at [facility] w/ [facility]. Patient advised she had an unexpected procedure come up that requires her to take some time off and she just has a lot going on right now. - dmp

## 2024-03-13 ENCOUNTER — OFFICE VISIT (OUTPATIENT)
Dept: SURGERY | Facility: CLINIC | Age: 59
End: 2024-03-13
Payer: COMMERCIAL

## 2024-03-13 DIAGNOSIS — Z98.890 POST-OPERATIVE STATE: Primary | ICD-10-CM

## 2024-03-13 PROCEDURE — 1160F RVW MEDS BY RX/DR IN RCRD: CPT | Mod: CPTII,S$GLB,, | Performed by: SURGERY

## 2024-03-13 PROCEDURE — 1159F MED LIST DOCD IN RCRD: CPT | Mod: CPTII,S$GLB,, | Performed by: SURGERY

## 2024-03-13 PROCEDURE — 99024 POSTOP FOLLOW-UP VISIT: CPT | Mod: S$GLB,,, | Performed by: SURGERY

## 2024-03-13 RX ORDER — OXYCODONE AND ACETAMINOPHEN 5; 325 MG/1; MG/1
TABLET ORAL
COMMUNITY
Start: 2024-03-07

## 2024-03-13 NOTE — LETTER
March 13, 2024    Marga Keenan  [address] [place] [address]         [facility] [facility] General Surgery  [address]  [place] [address]  Phone: [phone]  Fax: [phone] March 13, 2024     Patient: Marga Keenan   YOB: 1965   Date of Visit: 3/13/2024       To Whom It May Concern:    It is my medical opinion that Marga Keenan may return to light duty immediately with the following restrictions: No lifting/pushing/pulling greater than 25 lbs until 3/25/25 .    If you have any questions or concerns, please don't hesitate to call.    Sincerely,        Gordon Hernández MD

## 2024-03-13 NOTE — PROGRESS NOTES
HPI:  Postop laparoscopic cholecystectomy.  Path report shows cholelithiasis with chronic cholecystitis.  Still says she has some left upper quadrant abdominal pain but I did not see anything at the time of surgery to account for that as she was having this preoperative as well.  Still has a little bit of a bitter taste in her mouth likely related to the anesthesia.    PHYSICAL EXAM:  Abdomen is flat and soft and bowel sounds are good.  The incisions are healing well.  Subcuticular suture removed  ASSESSMENT:    Stable post lap choly  PLAN:      Patient told to revisit as needed and to call if any concerns or questions

## 2024-04-08 ENCOUNTER — TELEPHONE (OUTPATIENT)
Dept: SURGERY | Facility: CLINIC | Age: 59
End: 2024-04-08
Payer: COMMERCIAL

## 2024-04-08 NOTE — TELEPHONE ENCOUNTER
----- Message from Namrata Weston sent at 4/8/2024  9:53 AM CDT -----  Contact: self  Type:  Patient Returning Call    Who Called:Marga Keenan  Who Left Message for Patient:unsure  Does the patient know what this is regarding?:post op complications/concerns  Would the patient rather a call back or a response via [facility]?   Best Call Back Number:[phone]  Additional Information: pain around surgical site

## 2024-04-09 ENCOUNTER — TELEPHONE (OUTPATIENT)
Dept: SURGERY | Facility: CLINIC | Age: 59
End: 2024-04-09
Payer: COMMERCIAL

## 2024-04-09 NOTE — TELEPHONE ENCOUNTER
Patient complains of soreness at incision site after doing strenuous activity at work and digging in her yard at home. She denies any fever, redness, swelling, heat, or drainage from insicision site. Offered appt. For tomorrow, pt. Declined. Assured that this is likely because she just overdid things but encouraged to call prn.

## 2024-04-09 NOTE — TELEPHONE ENCOUNTER
----- Message from Katelyn Patino sent at 4/9/2024 12:45 PM CDT -----  Contact: PT  Type:  Patient Returning Call    Who Called:Marga Keenan   Who Left Message for Patient:Nurse (pt unsure)  Does the patient know what this is regarding?:(Pt experiencing pain on incision site)  Would the patient rather a call back or a response via [facility]? Call  Best Call Back Number:[phone]   Additional Information: n/a

## 2024-07-12 ENCOUNTER — TELEPHONE (OUTPATIENT)
Dept: GASTROENTEROLOGY | Facility: CLINIC | Age: 59
End: 2024-07-12
Payer: COMMERCIAL

## 2024-07-12 VITALS — HEIGHT: 66 IN | BODY MASS INDEX: 30.53 KG/M2 | WEIGHT: 190 LBS

## 2024-07-12 DIAGNOSIS — Z86.010 HISTORY OF COLON POLYPS: ICD-10-CM

## 2024-07-12 DIAGNOSIS — R19.7 DIARRHEA, UNSPECIFIED TYPE: ICD-10-CM

## 2024-07-12 DIAGNOSIS — Z12.11 SCREENING FOR COLON CANCER: Primary | ICD-10-CM

## 2024-07-16 ENCOUNTER — TELEPHONE (OUTPATIENT)
Dept: GASTROENTEROLOGY | Facility: CLINIC | Age: 59
End: 2024-07-16
Payer: COMMERCIAL

## 2024-07-16 NOTE — TELEPHONE ENCOUNTER
----- Message from Ab Epps MA sent at 7/15/2024  4:08 PM CDT -----  Regarding: CANCEL Colonoscopy    ----- Message -----  From: Temitope Aparicio  Sent: 7/15/2024   1:22 PM CDT  To: Navjot HERNANDEZ Staff    Name of Who is Calling:pt           What is the request in detail:requesting to cancel colonoscopy           Can the clinic reply by [facility]:no           What Number to Call Back if not in [facility]: [phone]